# Patient Record
Sex: MALE | Race: BLACK OR AFRICAN AMERICAN | NOT HISPANIC OR LATINO | Employment: OTHER | URBAN - METROPOLITAN AREA
[De-identification: names, ages, dates, MRNs, and addresses within clinical notes are randomized per-mention and may not be internally consistent; named-entity substitution may affect disease eponyms.]

---

## 2023-01-29 PROBLEM — R42 DIZZINESS: Status: ACTIVE | Noted: 2023-01-29

## 2023-01-29 PROBLEM — R25.1 TREMOR: Status: ACTIVE | Noted: 2023-01-29

## 2023-01-29 PROBLEM — N20.0 NEPHROLITHIASIS: Status: ACTIVE | Noted: 2023-01-29

## 2023-01-29 PROBLEM — I48.0 PAROXYSMAL ATRIAL FIBRILLATION (MULTI): Status: ACTIVE | Noted: 2023-01-29

## 2023-01-29 PROBLEM — M25.562 BILATERAL KNEE PAIN: Status: ACTIVE | Noted: 2023-01-29

## 2023-01-29 PROBLEM — R35.1 NOCTURIA: Status: ACTIVE | Noted: 2023-01-29

## 2023-01-29 PROBLEM — H52.00 HYPEROPIA: Status: ACTIVE | Noted: 2023-01-29

## 2023-01-29 PROBLEM — E55.9 VITAMIN D DEFICIENCY: Status: ACTIVE | Noted: 2023-01-29

## 2023-01-29 PROBLEM — H25.10 NUCLEAR SCLEROTIC CATARACT: Status: ACTIVE | Noted: 2023-01-29

## 2023-01-29 PROBLEM — N18.9 ANEMIA IN CKD (CHRONIC KIDNEY DISEASE): Status: ACTIVE | Noted: 2023-01-29

## 2023-01-29 PROBLEM — M47.819 SPONDYLOSIS WITHOUT MYELOPATHY: Status: ACTIVE | Noted: 2023-01-29

## 2023-01-29 PROBLEM — G25.0 ESSENTIAL TREMOR: Status: ACTIVE | Noted: 2023-01-29

## 2023-01-29 PROBLEM — K40.90 HERNIA, INGUINAL: Status: ACTIVE | Noted: 2023-01-29

## 2023-01-29 PROBLEM — N28.9 KIDNEY LESION: Status: ACTIVE | Noted: 2023-01-29

## 2023-01-29 PROBLEM — I10 HYPERTENSION: Status: ACTIVE | Noted: 2023-01-29

## 2023-01-29 PROBLEM — M25.519 SHOULDER PAIN: Status: ACTIVE | Noted: 2023-01-29

## 2023-01-29 PROBLEM — M10.9 GOUT: Status: ACTIVE | Noted: 2023-01-29

## 2023-01-29 PROBLEM — Z98.890 HISTORY OF YAG LASER IRIDOTOMY: Status: ACTIVE | Noted: 2023-01-29

## 2023-01-29 PROBLEM — H01.016 BILATERAL ULCERATIVE BLEPHARITIS: Status: ACTIVE | Noted: 2023-01-29

## 2023-01-29 PROBLEM — R60.0 BILATERAL EDEMA OF LOWER EXTREMITY: Status: ACTIVE | Noted: 2023-01-29

## 2023-01-29 PROBLEM — H26.9 CORTICAL CATARACT: Status: ACTIVE | Noted: 2023-01-29

## 2023-01-29 PROBLEM — M67.431 GANGLION OF RIGHT WRIST: Status: ACTIVE | Noted: 2023-01-29

## 2023-01-29 PROBLEM — E78.5 HYPERLIPIDEMIA: Status: ACTIVE | Noted: 2023-01-29

## 2023-01-29 PROBLEM — E05.90 HYPERTHYROIDISM: Status: ACTIVE | Noted: 2023-01-29

## 2023-01-29 PROBLEM — H52.4 BILATERAL PRESBYOPIA: Status: ACTIVE | Noted: 2023-01-29

## 2023-01-29 PROBLEM — R63.4 ABNORMAL WEIGHT LOSS: Status: ACTIVE | Noted: 2023-01-29

## 2023-01-29 PROBLEM — N25.81 SECONDARY RENAL HYPERPARATHYROIDISM (MULTI): Status: ACTIVE | Noted: 2023-01-29

## 2023-01-29 PROBLEM — I25.10 CAD (CORONARY ARTERY DISEASE): Status: ACTIVE | Noted: 2023-01-29

## 2023-01-29 PROBLEM — H04.123 BILATERAL DRY EYES: Status: ACTIVE | Noted: 2023-01-29

## 2023-01-29 PROBLEM — L85.3 XEROSIS CUTIS: Status: ACTIVE | Noted: 2023-01-29

## 2023-01-29 PROBLEM — D63.1 ANEMIA IN CKD (CHRONIC KIDNEY DISEASE): Status: ACTIVE | Noted: 2023-01-29

## 2023-01-29 PROBLEM — M25.561 BILATERAL KNEE PAIN: Status: ACTIVE | Noted: 2023-01-29

## 2023-01-29 PROBLEM — N28.1 RENAL CYST, RIGHT: Status: ACTIVE | Noted: 2023-01-29

## 2023-01-29 PROBLEM — H91.90 HEARING LOSS: Status: ACTIVE | Noted: 2023-01-29

## 2023-01-29 PROBLEM — M19.049 OSTEOARTHRITIS, HAND: Status: ACTIVE | Noted: 2023-01-29

## 2023-01-29 PROBLEM — N28.1 COMPLEX RENAL CYST: Status: ACTIVE | Noted: 2023-01-29

## 2023-01-29 PROBLEM — M75.100 TORN ROTATOR CUFF: Status: ACTIVE | Noted: 2023-01-29

## 2023-01-29 PROBLEM — H25.12 CATARACT, NUCLEAR SCLEROTIC, LEFT EYE: Status: ACTIVE | Noted: 2023-01-29

## 2023-01-29 PROBLEM — H25.11 CATARACT, NUCLEAR SCLEROTIC, RIGHT EYE: Status: ACTIVE | Noted: 2023-01-29

## 2023-01-29 PROBLEM — H02.831 DERMATOCHALASIS OF BOTH UPPER EYELIDS: Status: ACTIVE | Noted: 2023-01-29

## 2023-01-29 PROBLEM — H52.203 ASTIGMATISM OF BOTH EYES: Status: ACTIVE | Noted: 2023-01-29

## 2023-01-29 PROBLEM — L85.1 ACQUIRED PLANTAR POROKERATOSIS: Status: ACTIVE | Noted: 2023-01-29

## 2023-01-29 PROBLEM — N28.89 RENAL MASS: Status: ACTIVE | Noted: 2023-01-29

## 2023-01-29 PROBLEM — I65.29 CAROTID ATHEROSCLEROSIS: Status: ACTIVE | Noted: 2023-01-29

## 2023-01-29 PROBLEM — H40.039 ANATOMICAL NARROW ANGLE: Status: ACTIVE | Noted: 2023-01-29

## 2023-01-29 PROBLEM — B35.1 MYCOTIC TOENAILS: Status: ACTIVE | Noted: 2023-01-29

## 2023-01-29 PROBLEM — M79.672 LEFT FOOT PAIN: Status: ACTIVE | Noted: 2023-01-29

## 2023-01-29 PROBLEM — H81.10 BENIGN POSITIONAL VERTIGO: Status: ACTIVE | Noted: 2023-01-29

## 2023-01-29 PROBLEM — H02.834 DERMATOCHALASIS OF BOTH UPPER EYELIDS: Status: ACTIVE | Noted: 2023-01-29

## 2023-01-29 PROBLEM — M25.512 LEFT SHOULDER PAIN: Status: ACTIVE | Noted: 2023-01-29

## 2023-01-29 PROBLEM — K40.20 BILATERAL INGUINAL HERNIA: Status: ACTIVE | Noted: 2023-01-29

## 2023-01-29 PROBLEM — H90.3 SENSORINEURAL HEARING LOSS, BILATERAL: Status: ACTIVE | Noted: 2023-01-29

## 2023-01-29 PROBLEM — D47.2 MONOCLONAL GAMMOPATHY OF UNDETERMINED SIGNIFICANCE: Status: ACTIVE | Noted: 2023-01-29

## 2023-01-29 PROBLEM — J20.9 ACUTE BRONCHITIS: Status: ACTIVE | Noted: 2023-01-29

## 2023-01-29 PROBLEM — J06.9 VIRAL UPPER RESPIRATORY INFECTION: Status: ACTIVE | Noted: 2023-01-29

## 2023-01-29 PROBLEM — I25.111: Status: ACTIVE | Noted: 2023-01-29

## 2023-01-29 PROBLEM — H02.403 PTOSIS OF EYELID, BILATERAL: Status: ACTIVE | Noted: 2023-01-29

## 2023-01-29 PROBLEM — N18.30 CHRONIC KIDNEY DISEASE, STAGE III (MODERATE) (MULTI): Status: ACTIVE | Noted: 2023-01-29

## 2023-01-29 PROBLEM — H01.013 BILATERAL ULCERATIVE BLEPHARITIS: Status: ACTIVE | Noted: 2023-01-29

## 2023-01-29 RX ORDER — CLOPIDOGREL BISULFATE 75 MG/1
1 TABLET ORAL DAILY
COMMUNITY
Start: 2020-12-17

## 2023-01-29 RX ORDER — CHOLECALCIFEROL (VITAMIN D3) 25 MCG
TABLET ORAL 3 TIMES WEEKLY
COMMUNITY
Start: 2014-07-11

## 2023-01-29 RX ORDER — ALLOPURINOL 100 MG/1
1 TABLET ORAL DAILY
COMMUNITY
Start: 2012-12-06

## 2023-01-29 RX ORDER — ATORVASTATIN CALCIUM 80 MG/1
1 TABLET, FILM COATED ORAL NIGHTLY
COMMUNITY
Start: 2020-12-17

## 2023-01-29 RX ORDER — FUROSEMIDE 40 MG/1
1 TABLET ORAL DAILY
COMMUNITY
Start: 2021-05-06

## 2023-01-29 RX ORDER — PANTOPRAZOLE SODIUM 40 MG/1
1 TABLET, DELAYED RELEASE ORAL DAILY
COMMUNITY
Start: 2020-12-16

## 2023-01-29 RX ORDER — ASPIRIN 81 MG/1
1 TABLET ORAL DAILY
COMMUNITY

## 2023-01-29 RX ORDER — CLOBETASOL PROPIONATE 0.5 MG/G
OINTMENT TOPICAL 2 TIMES DAILY
COMMUNITY
Start: 2021-09-14

## 2023-01-29 RX ORDER — HYDRALAZINE HYDROCHLORIDE 50 MG/1
1 TABLET, FILM COATED ORAL 3 TIMES DAILY
COMMUNITY
Start: 2020-11-11

## 2023-01-29 RX ORDER — ISOSORBIDE MONONITRATE 60 MG/1
1 TABLET, EXTENDED RELEASE ORAL DAILY
COMMUNITY
Start: 2021-02-10

## 2023-01-29 RX ORDER — FERROUS SULFATE 324(65)MG
1 TABLET, DELAYED RELEASE (ENTERIC COATED) ORAL EVERY OTHER DAY
COMMUNITY
Start: 2021-05-06

## 2023-01-29 RX ORDER — POTASSIUM CHLORIDE 20 MEQ/1
1 TABLET, EXTENDED RELEASE ORAL DAILY
COMMUNITY
Start: 2021-05-06 | End: 2023-08-09 | Stop reason: SDUPTHER

## 2023-08-09 DIAGNOSIS — E87.6 HYPOKALEMIA: ICD-10-CM

## 2023-08-09 RX ORDER — POTASSIUM CHLORIDE 20 MEQ/1
20 TABLET, EXTENDED RELEASE ORAL DAILY
Qty: 30 TABLET | Refills: 0 | Status: SHIPPED | OUTPATIENT
Start: 2023-08-09 | End: 2023-09-08

## 2023-08-09 NOTE — TELEPHONE ENCOUNTER
Rx Refill Request Telephone Encounter    Name:  Barrie Simon  :  [id number]  Medication Name:  potassium chloride 20mEq  Specific Pharmacy location:  [facility]

## 2023-10-03 ENCOUNTER — APPOINTMENT (OUTPATIENT)
Dept: PRIMARY CARE | Facility: CLINIC | Age: 88
End: 2023-10-03
Payer: MEDICARE